# Patient Record
Sex: MALE | Race: WHITE | Employment: UNEMPLOYED | URBAN - METROPOLITAN AREA
[De-identification: names, ages, dates, MRNs, and addresses within clinical notes are randomized per-mention and may not be internally consistent; named-entity substitution may affect disease eponyms.]

---

## 2023-05-25 ENCOUNTER — APPOINTMENT (OUTPATIENT)
Dept: CT IMAGING | Age: 48
End: 2023-05-25
Payer: COMMERCIAL

## 2023-05-25 ENCOUNTER — HOSPITAL ENCOUNTER (EMERGENCY)
Age: 48
Discharge: HOME | End: 2023-05-25
Attending: EMERGENCY MEDICINE
Payer: COMMERCIAL

## 2023-05-25 VITALS
SYSTOLIC BLOOD PRESSURE: 122 MMHG | DIASTOLIC BLOOD PRESSURE: 76 MMHG | TEMPERATURE: 98.6 F | OXYGEN SATURATION: 95 % | HEIGHT: 72 IN | WEIGHT: 140 LBS | RESPIRATION RATE: 17 BRPM | BODY MASS INDEX: 18.96 KG/M2 | HEART RATE: 66 BPM

## 2023-05-25 DIAGNOSIS — G89.29 CHRONIC NONINTRACTABLE HEADACHE, UNSPECIFIED HEADACHE TYPE: Primary | ICD-10-CM

## 2023-05-25 DIAGNOSIS — R51.9 CHRONIC NONINTRACTABLE HEADACHE, UNSPECIFIED HEADACHE TYPE: Primary | ICD-10-CM

## 2023-05-25 LAB
ALBUMIN SERPL-MCNC: 4 G/DL (ref 3.5–5.2)
ALBUMIN/GLOB SERPL: 1.4 {RATIO} (ref 1–2.5)
ALP SERPL-CCNC: 83 U/L (ref 40–129)
ALT SERPL-CCNC: 13 U/L (ref 5–41)
ANION GAP SERPL CALCULATED.3IONS-SCNC: 16 MMOL/L (ref 9–17)
AST SERPL-CCNC: 26 U/L
BASOPHILS # BLD: 0 K/UL (ref 0–0.2)
BASOPHILS NFR BLD: 0 % (ref 0–2)
BILIRUB SERPL-MCNC: 0.3 MG/DL (ref 0.3–1.2)
BUN SERPL-MCNC: 20 MG/DL (ref 6–20)
CALCIUM SERPL-MCNC: 8.8 MG/DL (ref 8.6–10.4)
CHLORIDE SERPL-SCNC: 105 MMOL/L (ref 98–107)
CO2 SERPL-SCNC: 19 MMOL/L (ref 20–31)
CREAT SERPL-MCNC: 1.17 MG/DL (ref 0.7–1.2)
EOSINOPHIL # BLD: 0 K/UL (ref 0–0.4)
EOSINOPHILS RELATIVE PERCENT: 0 % (ref 1–4)
ERYTHROCYTE [DISTWIDTH] IN BLOOD BY AUTOMATED COUNT: 14 % (ref 11.8–14.4)
GFR SERPL CREATININE-BSD FRML MDRD: >60 ML/MIN/1.73M2
GLUCOSE SERPL-MCNC: 82 MG/DL (ref 70–99)
HCT VFR BLD AUTO: 44.8 % (ref 40.7–50.3)
HGB BLD-MCNC: 14.8 G/DL (ref 13–17)
IMM GRANULOCYTES # BLD AUTO: 0 K/UL (ref 0–0.3)
IMM GRANULOCYTES NFR BLD: 0 %
LACTIC ACID, WHOLE BLOOD: 1.3 MMOL/L (ref 0.7–2.1)
LYMPHOCYTES # BLD: 30 % (ref 24–44)
LYMPHOCYTES NFR BLD: 1.02 K/UL (ref 1–4.8)
MCH RBC QN AUTO: 31.1 PG (ref 25.2–33.5)
MCHC RBC AUTO-ENTMCNC: 33 G/DL (ref 28.4–34.8)
MCV RBC AUTO: 94.1 FL (ref 82.6–102.9)
MONOCYTES NFR BLD: 0.48 K/UL (ref 0.1–0.8)
MONOCYTES NFR BLD: 14 % (ref 1–7)
MORPHOLOGY: NORMAL
NEUTROPHILS NFR BLD: 56 % (ref 36–66)
NEUTS SEG NFR BLD: 1.9 K/UL (ref 1.8–7.7)
NRBC AUTOMATED: 0 PER 100 WBC
PLATELET # BLD AUTO: ABNORMAL K/UL (ref 138–453)
PLATELET, FLUORESCENCE: 136 K/UL (ref 138–453)
PLATELETS.RETICULATED NFR BLD AUTO: 9.2 % (ref 1.1–10.3)
POTASSIUM SERPL-SCNC: 4.3 MMOL/L (ref 3.7–5.3)
PROT SERPL-MCNC: 6.8 G/DL (ref 6.4–8.3)
RBC # BLD AUTO: 4.76 M/UL (ref 4.21–5.77)
SODIUM SERPL-SCNC: 140 MMOL/L (ref 135–144)
TROPONIN I SERPL HS-MCNC: 7 NG/L (ref 0–22)
WBC OTHER # BLD: 3.4 K/UL (ref 3.5–11.3)

## 2023-05-25 PROCEDURE — 85055 RETICULATED PLATELET ASSAY: CPT

## 2023-05-25 PROCEDURE — 85025 COMPLETE CBC W/AUTO DIFF WBC: CPT

## 2023-05-25 PROCEDURE — 6360000002 HC RX W HCPCS

## 2023-05-25 PROCEDURE — 84484 ASSAY OF TROPONIN QUANT: CPT

## 2023-05-25 PROCEDURE — 99285 EMERGENCY DEPT VISIT HI MDM: CPT

## 2023-05-25 PROCEDURE — 93005 ELECTROCARDIOGRAM TRACING: CPT | Performed by: EMERGENCY MEDICINE

## 2023-05-25 PROCEDURE — 71275 CT ANGIOGRAPHY CHEST: CPT

## 2023-05-25 PROCEDURE — 83605 ASSAY OF LACTIC ACID: CPT

## 2023-05-25 PROCEDURE — 80053 COMPREHEN METABOLIC PANEL: CPT

## 2023-05-25 PROCEDURE — 96372 THER/PROPH/DIAG INJ SC/IM: CPT

## 2023-05-25 PROCEDURE — 6360000004 HC RX CONTRAST MEDICATION: Performed by: EMERGENCY MEDICINE

## 2023-05-25 PROCEDURE — 74174 CTA ABD&PLVS W/CONTRAST: CPT

## 2023-05-25 RX ORDER — PROCHLORPERAZINE EDISYLATE 5 MG/ML
10 INJECTION INTRAMUSCULAR; INTRAVENOUS ONCE
Status: COMPLETED | OUTPATIENT
Start: 2023-05-25 | End: 2023-05-25

## 2023-05-25 RX ADMIN — PROCHLORPERAZINE EDISYLATE 10 MG: 5 INJECTION INTRAMUSCULAR; INTRAVENOUS at 13:36

## 2023-05-25 RX ADMIN — IOPAMIDOL 100 ML: 755 INJECTION, SOLUTION INTRAVENOUS at 14:58

## 2023-05-25 ASSESSMENT — PAIN DESCRIPTION - ORIENTATION: ORIENTATION: UPPER

## 2023-05-25 ASSESSMENT — ENCOUNTER SYMPTOMS
ABDOMINAL PAIN: 0
COUGH: 0
CHOKING: 0
BACK PAIN: 0
VOMITING: 0
NAUSEA: 0

## 2023-05-25 ASSESSMENT — PAIN - FUNCTIONAL ASSESSMENT: PAIN_FUNCTIONAL_ASSESSMENT: 0-10

## 2023-05-25 ASSESSMENT — PAIN SCALES - GENERAL: PAINLEVEL_OUTOF10: 6

## 2023-05-25 ASSESSMENT — PAIN DESCRIPTION - LOCATION: LOCATION: BACK

## 2023-05-25 NOTE — ED TRIAGE NOTES
Pt arrived to the ED through triage with c/o of chest pain. Pt stated he has been having the chest pain for the past couple days with blurred vision, nausea and some dizziness. Pt denies any medical hx, drug or alcohol use. Pt hooked up to monitor, BP, pulse ox. IV access started with labs drawn.  Pt appears to be in no acute distress at this time

## 2023-05-25 NOTE — ED PROVIDER NOTES
[facility]     Emergency Department     Faculty Attestation    I performed a history and physical examination of the patient and discussed management with the resident. I have reviewed and agree with the residents findings including all diagnostic interpretations, and treatment plans as written at the time of my review. Any areas of disagreement are noted on the chart. I was personally present for the key portions of any procedures. I have documented in the chart those procedures where I was not present during the key portions. For Physician Assistant/ Nurse Practitioner cases/documentation I have personally evaluated this patient and have completed at least one if not all key elements of the E/M (history, physical exam, and MDM). Additional findings are as noted. PtName: Bebeto Thornton  MRN: [id number]  [place] 1975  Date of evaluation: 5/25/23  Note Started: 1:27 PM EDT    Primary Care Physician: No primary care provider on file. History: This is a 41 y.o. male who presents to the Emergency Department with complaint of headache, chest and back pain. Patient complains of a headache this been ongoing for last 2 days. It was not a sudden onset headache and not the worst headache of his life. He denies any blurred vision or diplopia. Denies any numbness, tingling or weakness. Patient states potation Tylenol and until today had no relief. Today he did take some Tylenol and had took relief of his headache. He also presents with complaints of chest and back pain which describes as a burning sensation. He denies any associated shortness of breath, nausea, vomiting or diaphoresis. Movement can exacerbate his symptoms. He denies any history of hypertension diabetes or smoking. He denies any family history of early coronary disease. He denies any recent long travel or pain or swelling in his legs.     Physical:   height is 6' (1.829 m) and

## 2023-05-25 NOTE — ED PROVIDER NOTES
[facility]  ED  Emergency Department Encounter  Emergency Medicine Resident     Pt Name:Rajinder Mccollum  MRN: [id number]  [place] 1975  Date of evaluation: 5/25/23  PCP:  No primary care provider on file. Note Started: 1:32 PM EDT      CHIEF COMPLAINT       Chief Complaint   Patient presents with    Back Pain     Upper back pain     Headache    Chest Pain     Midsternal ever 5-6 mins       HISTORY OF PRESENT ILLNESS  (Location/Symptom, Timing/Onset, Context/Setting, Quality, Duration, Modifying Factors, Severity.)      Parker Moeller is a 41 y.o. male who presents with headache, chest pain. Patient reports that he does have a history of chronic headache but usually pain resolves with taking Tylenol. This time the headache started 2 days ago and is gradually increasing in intensity and is not relieved with Tylenol. Headache is mainly in the frontal part of the head and associated with nausea, blurry vision and chest pain. Chest pain is mainly sharp/shooting type only last for few seconds and radiates across the sternum. Movement of left arm increases the chest pain. Patient does not have any risk factor for cardiovascular disorder. No risk factors for PE including recent travel, immobility, recent surgery, hormonal medications. Patient reports generally being very active and in good health and does not take any medications prescribed or over-the-counter on regular basis. PAST MEDICAL / SURGICAL / SOCIAL / FAMILY HISTORY      has no past medical history on file. has no past surgical history on file.       Social History     Socioeconomic History    Marital status:      Spouse name: Not on file    Number of children: Not on file    Years of education: Not on file    Highest education level: Not on file   Occupational History    Not on file   Tobacco Use    Smoking status: Never    Smokeless tobacco: Not on file   Substance and Sexual Activity    Alcohol use: No    Drug

## 2023-05-25 NOTE — DISCHARGE INSTRUCTIONS
You were seen in the ED for headache and chest pain. To rule out aortic dissection, CTA chest abdomen and pelvis was done which was unremarkable. If you develop new symptoms including tearing chest pain radiating to your back, numbness, weakness, tingling or sudden severe headache please come back to the emergency department for evaluation. Please establish care with a PCP for follow-up.

## 2023-05-25 NOTE — ED PROVIDER NOTES
[facility]  FACULTY HANDOFF       Handoff taken on the following patient from prior Attending Physician: Dr. Randy Mon  Pt Name: Chidi Ching  PCP:  No primary care provider on file. Attestation  I was available and discussed any additional care issues that arose and coordinated the management plans with the resident(s) caring for the patient during my duty period. Any areas of disagreement with resident's documentation of care or procedures are noted on the chart. I was personally present for the key portions of any/all procedures during my duty period. I have documented in the chart those procedures where I was not present during the key portions. CHIEF COMPLAINT       Chief Complaint   Patient presents with    Back Pain     Upper back pain     Headache    Chest Pain     Midsternal ever 5-6 mins         CURRENT MEDICATIONS     Previous Medications  Previous Medications    No medications on file       Encounter Medications  Orders Placed This Encounter   Medications    prochlorperazine (COMPAZINE) injection 10 mg    iopamidol (ISOVUE-370) 76 % injection 100 mL       ALLERGIES     has No Known Allergies.       RECENT VITALS:   Temp: 98.6 °F (37 °C),  Pulse: 66, Respirations: 17, BP: 122/76    RADIOLOGY:   CTA CHEST W WO CONTRAST    (Results Pending)   CTA ABDOMEN PELVIS W CONTRAST    (Results Pending)       LABS:  Labs Reviewed   CBC WITH AUTO DIFFERENTIAL - Abnormal; Notable for the following components:       Result Value    WBC 3.4 (*)     Monocytes 14 (*)     Eosinophils % 0 (*)     All other components within normal limits   COMPREHENSIVE METABOLIC PANEL - Abnormal; Notable for the following components:    CO2 19 (*)     All other components within normal limits   IMMATURE PLATELET FRACTION - Abnormal; Notable for the following components:    Platelet, Fluorescence 136 (*)     All other components within normal limits   TROPONIN   LACTIC ACID           PLAN/ TASKS OUTSTANDING     Pt

## 2023-05-26 LAB
EKG ATRIAL RATE: 53 BPM
EKG P AXIS: 74 DEGREES
EKG P-R INTERVAL: 138 MS
EKG Q-T INTERVAL: 428 MS
EKG QRS DURATION: 90 MS
EKG QTC CALCULATION (BAZETT): 401 MS
EKG R AXIS: 89 DEGREES
EKG T AXIS: 77 DEGREES
EKG VENTRICULAR RATE: 53 BPM